# Patient Record
Sex: MALE | Race: WHITE
[De-identification: names, ages, dates, MRNs, and addresses within clinical notes are randomized per-mention and may not be internally consistent; named-entity substitution may affect disease eponyms.]

---

## 2018-02-26 ENCOUNTER — HOSPITAL ENCOUNTER (OUTPATIENT)
Dept: HOSPITAL 96 - M.ERS | Age: 28
Setting detail: OBSERVATION
Discharge: HOME | End: 2018-02-26
Attending: INTERNAL MEDICINE | Admitting: INTERNAL MEDICINE
Payer: COMMERCIAL

## 2018-02-26 VITALS — SYSTOLIC BLOOD PRESSURE: 138 MMHG | DIASTOLIC BLOOD PRESSURE: 86 MMHG

## 2018-02-26 VITALS — WEIGHT: 291 LBS | HEIGHT: 69.02 IN | BODY MASS INDEX: 43.1 KG/M2

## 2018-02-26 VITALS — SYSTOLIC BLOOD PRESSURE: 130 MMHG | DIASTOLIC BLOOD PRESSURE: 83 MMHG

## 2018-02-26 VITALS — SYSTOLIC BLOOD PRESSURE: 135 MMHG | DIASTOLIC BLOOD PRESSURE: 81 MMHG

## 2018-02-26 VITALS — SYSTOLIC BLOOD PRESSURE: 165 MMHG | DIASTOLIC BLOOD PRESSURE: 107 MMHG

## 2018-02-26 DIAGNOSIS — N13.2: Primary | ICD-10-CM

## 2018-02-26 DIAGNOSIS — N13.30: ICD-10-CM

## 2018-02-26 DIAGNOSIS — E66.9: ICD-10-CM

## 2018-02-26 DIAGNOSIS — R10.32: ICD-10-CM

## 2018-02-26 LAB
ABSOLUTE BASOPHILS: 0 THOU/UL (ref 0–0.2)
ABSOLUTE EOSINOPHILS: 0.1 THOU/UL (ref 0–0.7)
ABSOLUTE MONOCYTES: 0.8 THOU/UL (ref 0–1.2)
ANION GAP SERPL CALC-SCNC: 6 MMOL/L (ref 7–16)
BASOPHILS NFR BLD AUTO: 0.3 %
BILIRUB UR-MCNC: NEGATIVE MG/DL
BUN SERPL-MCNC: 20 MG/DL (ref 7–18)
CALCIUM SERPL-MCNC: 8.6 MG/DL (ref 8.5–10.1)
CHLORIDE SERPL-SCNC: 105 MMOL/L (ref 98–107)
CO2 SERPL-SCNC: 29 MMOL/L (ref 21–32)
COLOR UR: YELLOW
CREAT SERPL-MCNC: 1.1 MG/DL (ref 0.6–1.3)
EOSINOPHIL NFR BLD: 0.8 %
GLUCOSE SERPL-MCNC: 95 MG/DL (ref 70–99)
GRANULOCYTES NFR BLD MANUAL: 73.3 %
HCT VFR BLD CALC: 40.5 % (ref 42–52)
HGB BLD-MCNC: 13.5 GM/DL (ref 14–18)
KETONES UR STRIP-MCNC: NEGATIVE MG/DL
LYMPHOCYTES # BLD: 1.7 THOU/UL (ref 0.8–5.3)
LYMPHOCYTES NFR BLD AUTO: 17.5 %
MCH RBC QN AUTO: 29.6 PG (ref 26–34)
MCHC RBC AUTO-ENTMCNC: 33.4 G/DL (ref 28–37)
MCV RBC: 88.6 FL (ref 80–100)
MONOCYTES NFR BLD: 8.1 %
MPV: 8.5 FL. (ref 7.2–11.1)
NEUTROPHILS # BLD: 7.2 THOU/UL (ref 1.6–8.1)
NUCLEATED RBCS: 0 /100WBC
PLATELET COUNT*: 200 THOU/UL (ref 150–400)
POTASSIUM SERPL-SCNC: 3.6 MMOL/L (ref 3.5–5.1)
PROT UR QL STRIP: NEGATIVE
RBC # BLD AUTO: 4.57 MIL/UL (ref 4.5–6)
RBC # UR STRIP: (no result) /UL
RBC #/AREA URNS HPF: (no result) /HPF (ref 0–2)
RDW-CV: 12.8 % (ref 10.5–14.5)
SODIUM SERPL-SCNC: 140 MMOL/L (ref 136–145)
SP GR UR STRIP: 1.01 (ref 1–1.03)
SQUAMOUS: (no result) /LPF (ref 0–3)
URINE CLARITY: CLEAR
URINE GLUCOSE-RANDOM: NEGATIVE
URINE LEUKOCYTES-REFLEX: NEGATIVE
URINE NITRITE-REFLEX: NEGATIVE
URINE WBC-REFLEX: (no result) /HPF (ref 0–5)
UROBILINOGEN UR STRIP-ACNC: 0.2 E.U./DL (ref 0.2–1)
WBC # BLD AUTO: 9.8 THOU/UL (ref 4–11)

## 2018-02-26 NOTE — NUR
PATIENT DC TO HOME
ALL DC INFO GIVEN AND ACKNOWLEDGED
SIGNED COPIES GIVEN
IV REMOVED
PERSONAL BELONGINGS RETURNED
URINE STRAINER AND CDS OF SCANS SENT WITH PATIENT
PATIENT LEFT WITH [facility] AMB[facility] TO WAITING CAR IN GOOD CONDITION

## 2018-02-26 NOTE — NUR
PT WAS ADMITTED TO ROOM 206 DURING THIS SHIFT; VSS, [id number], NO PAIN AT TIME OF
ARRIVAL TO THE UNIT. HE IS ABLE TO COMMUNICATE HIS NEEDS TO STAFF EFFECTIVELY.
HE HAS DENIED THE NEED FOR PAIN MEDICATION UP TO THIS TIME. UROLOGY CONSULTED;
ALL OUTPUT TO BE STRAINED FOR STONES.

## 2018-02-26 NOTE — NUR
CM SPOKE TO THE PATIENT TO DISCUSS HOME SITUATION, DISCHARGE PLANNING, AND TO
INFOM OF THE ROLE OF CM. PATIENT ALERT, ORIENTED, AND INDEPENDENT WITH ADL'S.
PATIENT WORKS AND DRIVES. PATIENT RESIDES AT HOME WITH WIFE. PATIENT USES 0
DME. PATIENT PLANS TO RETURN HOME AT D/C AND DOES NOT BELIEVE THAT HE WILL
NEED ANYTHING. CM WILL REMAIN AVAILABLE TO ASSIST AND FOLLOW AS NEEDED.